# Patient Record
Sex: MALE
[De-identification: names, ages, dates, MRNs, and addresses within clinical notes are randomized per-mention and may not be internally consistent; named-entity substitution may affect disease eponyms.]

---

## 2021-12-03 ENCOUNTER — APPOINTMENT (OUTPATIENT)
Dept: UROLOGY | Facility: CLINIC | Age: 71
End: 2021-12-03
Payer: MEDICARE

## 2021-12-03 VITALS
HEART RATE: 75 BPM | WEIGHT: 155 LBS | DIASTOLIC BLOOD PRESSURE: 80 MMHG | HEIGHT: 66 IN | BODY MASS INDEX: 24.91 KG/M2 | SYSTOLIC BLOOD PRESSURE: 153 MMHG | RESPIRATION RATE: 17 BRPM | TEMPERATURE: 98.4 F

## 2021-12-03 PROBLEM — Z00.00 ENCOUNTER FOR PREVENTIVE HEALTH EXAMINATION: Status: ACTIVE | Noted: 2021-12-03

## 2021-12-03 PROCEDURE — 99204 OFFICE O/P NEW MOD 45 MIN: CPT

## 2021-12-03 NOTE — HISTORY OF PRESENT ILLNESS
[Urinary Urgency] : urinary urgency [FreeTextEntry1] : 71 year old male presents for 2nd opinion of prostate cancer and Hx of bladder cancer, BPH on tadalafil, Renal stones s/p ESWL in the past\par \par Has been followed and worked up by Dr. Marquez for 20 years ago.  \par PCP felt lump on DAVE years ago, led to the initial biopsy in 2002 -> negative\par \par PSA recently elevated to 5.37\par MRI 10/29/21 shows PI-RADS 2, 8 x 7 mm l anterior base TZ lesion.  MRI size 43 cc.\par TRUS 12 core biopsy 11/10/21 shows 1 core [place] 3+3 20% involvement, 1 core ASAP. \par Prostate Sonogram shows 60.8 cc gland\par ExoDx score 49.77\par \par Hx of 1.5 cm bladder wall lesion s/p TURBT in 2018 with LG Ta.  \par Surveillance cystoscopies and cytologies negative (last cysto 11/10/21 & cytology 10/14/21)\par \par PSA trend 3.7 (11/24/21) <- 5.35 (10/7/21) <- 4.6 (9/2019) <- 3.6 (12/2018)\par \par MedHx: HTN, GERD, Anxiety, HLD\par Meds: Tadalfil, Lexapro\par SocHx: No smoking hx, was at [facility] and is registered, no otherwise chemical exposure\par Allergies: None\par FamHx PCa in Brother (2 years older, had seed implantation) [Urinary Incontinence] : no urinary incontinence [Urinary Retention] : no urinary retention [Urinary Frequency] : no urinary frequency [Nocturia] : no nocturia [Straining] : no straining [Dysuria] : no dysuria [Hematuria - Gross] : no gross hematuria [Fever] : no fever [Fatigue] : no fatigue [Nausea] : no nausea

## 2021-12-03 NOTE — PHYSICAL EXAM
[General Appearance - Well Developed] : well developed [General Appearance - Well Nourished] : well nourished [Normal Appearance] : normal appearance [Well Groomed] : well groomed [General Appearance - In No Acute Distress] : no acute distress [Heart Rate And Rhythm] : Heart rate and rhythm were normal [Edema] : no peripheral edema [] : no respiratory distress [Exaggerated Use Of Accessory Muscles For Inspiration] : no accessory muscle use [Abdomen Soft] : soft [Abdomen Mass (___ Cm)] : no abdominal mass palpated [Normal Station and Gait] : the gait and station were normal for the patient's age [Skin Color & Pigmentation] : normal skin color and pigmentation [No Focal Deficits] : no focal deficits [Oriented To Time, Place, And Person] : oriented to person, place, and time

## 2021-12-03 NOTE — ASSESSMENT
[FreeTextEntry1] : 71 year old male presents for 2nd opinion of prostate cancer and Hx of bladder cancer, BPH on tadalafil, Renal stones s/p ESWL in the past\par \par Has been followed and worked up by Dr. Marquez for 20 years ago.  \par PCP felt lump on DAVE years ago, led to the initial biopsy in 2002 -> negative\par FamHx of PCa in Brother who received seeds\par \par PSA elevated to 5.37\par MRI 10/29/21 shows PI-RADS 2, 8 x 7 mm l anterior base TZ lesion.  MRI size 43 cc.\par TRUS 12 core biopsy 11/10/21 shows 1 core Genny 3+3 20% involvement, 1 core ASAP. \par Prostate Sonogram shows 60.8 cc gland\par ExoDx score 49.77\par  \par Hx of 1.5 cm bladder wall lesion s/p TURBT in 2018 with LG Ta s/p BCG \par Surveillance cystoscopies and cytologies negative (last cysto 11/10/21 & cytology 10/14/21)\par \par PSA trend 3.7 (11/24/21) <- 5.35 (10/7/21) <- 4.6 (9/2019) <- 3.6 (12/2018)\par \par - All options reviewed with patient for management of the PCa including AS, RT, Focal Therapy, Prostatectomy\par - Electing for AS for PCa\par - CT urogram given no imaging of upper tracts since the initial BCa diagnosis and hx of stones\par - f/u in 6 months for cystoscopy, PSA prior to returning, will call with results of CT urogram.  Will deteremine if repeat MRI needed based on PSA\par

## 2021-12-04 ENCOUNTER — TRANSCRIPTION ENCOUNTER (OUTPATIENT)
Age: 71
End: 2021-12-04

## 2021-12-17 ENCOUNTER — APPOINTMENT (OUTPATIENT)
Dept: CT IMAGING | Facility: CLINIC | Age: 71
End: 2021-12-17
Payer: MEDICARE

## 2021-12-17 ENCOUNTER — OUTPATIENT (OUTPATIENT)
Dept: OUTPATIENT SERVICES | Facility: HOSPITAL | Age: 71
LOS: 1 days | End: 2021-12-17

## 2021-12-17 ENCOUNTER — RESULT REVIEW (OUTPATIENT)
Age: 71
End: 2021-12-17

## 2021-12-17 PROCEDURE — 74178 CT ABD&PLV WO CNTR FLWD CNTR: CPT | Mod: 26

## 2022-06-03 ENCOUNTER — APPOINTMENT (OUTPATIENT)
Dept: UROLOGY | Facility: CLINIC | Age: 72
End: 2022-06-03

## 2022-07-15 ENCOUNTER — APPOINTMENT (OUTPATIENT)
Dept: UROLOGY | Facility: CLINIC | Age: 72
End: 2022-07-15

## 2022-07-15 ENCOUNTER — OUTPATIENT (OUTPATIENT)
Dept: OUTPATIENT SERVICES | Facility: HOSPITAL | Age: 72
LOS: 1 days | End: 2022-07-15
Payer: MEDICARE

## 2022-07-15 DIAGNOSIS — C67.9 MALIGNANT NEOPLASM OF BLADDER, UNSPECIFIED: ICD-10-CM

## 2022-07-15 DIAGNOSIS — C61 MALIGNANT NEOPLASM OF PROSTATE: ICD-10-CM

## 2022-07-15 DIAGNOSIS — R35.0 FREQUENCY OF MICTURITION: ICD-10-CM

## 2022-07-15 PROCEDURE — 88112 CYTOPATH CELL ENHANCE TECH: CPT | Mod: 26

## 2022-07-15 PROCEDURE — 52000 CYSTOURETHROSCOPY: CPT

## 2022-07-19 LAB — URINE CYTOLOGY: NORMAL

## 2022-08-03 ENCOUNTER — APPOINTMENT (OUTPATIENT)
Dept: MRI IMAGING | Facility: CLINIC | Age: 72
End: 2022-08-03

## 2022-09-07 ENCOUNTER — RX RENEWAL (OUTPATIENT)
Age: 72
End: 2022-09-07

## 2022-09-07 RX ORDER — TADALAFIL 5 MG/1
5 TABLET ORAL
Qty: 90 | Refills: 0 | Status: ACTIVE | COMMUNITY
Start: 2022-08-23 | End: 1900-01-01

## 2022-11-03 ENCOUNTER — TRANSCRIPTION ENCOUNTER (OUTPATIENT)
Age: 72
End: 2022-11-03

## 2022-11-28 ENCOUNTER — APPOINTMENT (OUTPATIENT)
Dept: MRI IMAGING | Facility: CLINIC | Age: 72
End: 2022-11-28

## 2022-11-28 ENCOUNTER — OUTPATIENT (OUTPATIENT)
Dept: OUTPATIENT SERVICES | Facility: HOSPITAL | Age: 72
LOS: 1 days | End: 2022-11-28

## 2022-11-28 PROCEDURE — 72197 MRI PELVIS W/O & W/DYE: CPT | Mod: 26

## 2022-12-27 ENCOUNTER — APPOINTMENT (OUTPATIENT)
Dept: UROLOGY | Facility: CLINIC | Age: 72
End: 2022-12-27

## 2022-12-27 VITALS
SYSTOLIC BLOOD PRESSURE: 174 MMHG | DIASTOLIC BLOOD PRESSURE: 82 MMHG | HEIGHT: 66 IN | TEMPERATURE: 98.6 F | HEART RATE: 54 BPM | WEIGHT: 156 LBS | RESPIRATION RATE: 17 BRPM | BODY MASS INDEX: 25.07 KG/M2

## 2022-12-27 PROCEDURE — 88112 CYTOPATH CELL ENHANCE TECH: CPT | Mod: 26

## 2022-12-27 PROCEDURE — 99213 OFFICE O/P EST LOW 20 MIN: CPT

## 2022-12-27 NOTE — PHYSICAL EXAM
[General Appearance - Well Developed] : well developed [General Appearance - Well Nourished] : well nourished [Abdomen Soft] : soft [FreeTextEntry1] : Benign 35g-40g DAVE

## 2022-12-27 NOTE — HISTORY OF PRESENT ILLNESS
[FreeTextEntry1] : Hans \par \par 72M presents for f/u for prostate cancer and Hx of bladder cancer, BPH on tadalafil, Renal stones s/p ESWL in the past \par \par PCP felt lump on DAVE years ago, led to the initial biopsy in 2002 -> negative \par \par PSA recently elevated to 5.37 \par MRI 10/29/21 shows PI-RADS 2, 8 x 7 mm l anterior base TZ lesion. MRI size 43 cc. \par TRUS 12 core biopsy 11/10/21 shows 1 core Genny 3+3 20% involvement, 1 core ASAP.  \par \par Last PSA 3.7 (11/22)  \par PSA trend 3.6 (6/22) <-- 3.7 (11/24/21) <- 5.35 (10/7/21) <- 4.6 (9/2019) <- 3.6 (12/2018) \par \par Hx of 1.5 cm bladder wall lesion s/p TURBT in 2018 with LG Ta.  \par Cysto July 2022 normal; cytology neg \par No hematuria\par \par   \par MedHx: HTN, GERD, Anxiety, HLD \par Meds: Tadalfil, Lexapro \par SocHx: No smoking hx, was at [facility] and is registered, no otherwise chemical exposure \par Allergies: None \par FamHx PCa in Brother (2 years older, had seed implantation) \par \par

## 2022-12-28 LAB — URINE CYTOLOGY: NORMAL

## 2023-09-13 ENCOUNTER — RX RENEWAL (OUTPATIENT)
Age: 73
End: 2023-09-13

## 2023-10-23 ENCOUNTER — TRANSCRIPTION ENCOUNTER (OUTPATIENT)
Age: 73
End: 2023-10-23

## 2023-11-14 LAB — PSA SERPL-MCNC: 5.78 NG/ML

## 2023-11-16 RX ORDER — TADALAFIL 5 MG/1
5 TABLET ORAL
Qty: 90 | Refills: 3 | Status: ACTIVE | COMMUNITY
Start: 2022-09-08 | End: 1900-01-01

## 2023-12-01 ENCOUNTER — APPOINTMENT (OUTPATIENT)
Dept: UROLOGY | Facility: CLINIC | Age: 73
End: 2023-12-01
Payer: MEDICARE

## 2023-12-01 ENCOUNTER — OUTPATIENT (OUTPATIENT)
Dept: OUTPATIENT SERVICES | Facility: HOSPITAL | Age: 73
LOS: 1 days | End: 2023-12-01
Payer: MEDICARE

## 2023-12-01 DIAGNOSIS — N40.1 BENIGN PROSTATIC HYPERPLASIA WITH LOWER URINARY TRACT SYMPMS: ICD-10-CM

## 2023-12-01 DIAGNOSIS — C61 MALIGNANT NEOPLASM OF PROSTATE: ICD-10-CM

## 2023-12-01 DIAGNOSIS — C67.9 MALIGNANT NEOPLASM OF BLADDER, UNSPECIFIED: ICD-10-CM

## 2023-12-01 DIAGNOSIS — R35.0 FREQUENCY OF MICTURITION: ICD-10-CM

## 2023-12-01 DIAGNOSIS — N13.8 BENIGN PROSTATIC HYPERPLASIA WITH LOWER URINARY TRACT SYMPMS: ICD-10-CM

## 2023-12-01 PROCEDURE — 52000 CYSTOURETHROSCOPY: CPT

## 2023-12-04 DIAGNOSIS — C61 MALIGNANT NEOPLASM OF PROSTATE: ICD-10-CM

## 2023-12-04 DIAGNOSIS — C67.9 MALIGNANT NEOPLASM OF BLADDER, UNSPECIFIED: ICD-10-CM

## 2023-12-04 DIAGNOSIS — N40.1 BENIGN PROSTATIC HYPERPLASIA WITH LOWER URINARY TRACT SYMPTOMS: ICD-10-CM

## 2023-12-05 LAB — URINE CYTOLOGY: NORMAL

## 2023-12-06 ENCOUNTER — OUTPATIENT (OUTPATIENT)
Dept: OUTPATIENT SERVICES | Facility: HOSPITAL | Age: 73
LOS: 1 days | End: 2023-12-06

## 2023-12-06 ENCOUNTER — APPOINTMENT (OUTPATIENT)
Dept: MRI IMAGING | Facility: CLINIC | Age: 73
End: 2023-12-06
Payer: MEDICARE

## 2023-12-06 ENCOUNTER — RESULT REVIEW (OUTPATIENT)
Age: 73
End: 2023-12-06

## 2023-12-06 PROCEDURE — 76498P: CUSTOM | Mod: 26

## 2023-12-06 PROCEDURE — 72197 MRI PELVIS W/O & W/DYE: CPT | Mod: 26

## 2023-12-22 ENCOUNTER — APPOINTMENT (OUTPATIENT)
Dept: UROLOGY | Facility: CLINIC | Age: 73
End: 2023-12-22

## 2024-08-16 ENCOUNTER — NON-APPOINTMENT (OUTPATIENT)
Age: 74
End: 2024-08-16

## 2024-08-30 ENCOUNTER — TRANSCRIPTION ENCOUNTER (OUTPATIENT)
Age: 74
End: 2024-08-30

## 2024-10-22 ENCOUNTER — APPOINTMENT (OUTPATIENT)
Dept: MRI IMAGING | Facility: CLINIC | Age: 74
End: 2024-10-22

## 2024-11-04 ENCOUNTER — RX RENEWAL (OUTPATIENT)
Age: 74
End: 2024-11-04

## 2024-11-05 ENCOUNTER — APPOINTMENT (OUTPATIENT)
Dept: UROLOGY | Facility: CLINIC | Age: 74
End: 2024-11-05

## 2025-07-30 ENCOUNTER — RX RENEWAL (OUTPATIENT)
Age: 75
End: 2025-07-30